# Patient Record
Sex: MALE | Race: BLACK OR AFRICAN AMERICAN | NOT HISPANIC OR LATINO | Employment: FULL TIME | URBAN - METROPOLITAN AREA
[De-identification: names, ages, dates, MRNs, and addresses within clinical notes are randomized per-mention and may not be internally consistent; named-entity substitution may affect disease eponyms.]

---

## 2017-01-19 ENCOUNTER — HOSPITAL ENCOUNTER (OUTPATIENT)
Dept: RADIOLOGY | Facility: HOSPITAL | Age: 52
Discharge: HOME | End: 2017-01-19
Attending: INTERNAL MEDICINE
Payer: MEDICAID

## 2017-01-19 DIAGNOSIS — M54.2 NECK PAIN: ICD-10-CM

## 2017-01-19 PROCEDURE — 72040 X-RAY EXAM NECK SPINE 2-3 VW: CPT | Mod: 26,,, | Performed by: RADIOLOGY

## 2017-01-19 PROCEDURE — 72040 X-RAY EXAM NECK SPINE 2-3 VW: CPT | Mod: TC

## 2017-03-05 ENCOUNTER — HOSPITAL ENCOUNTER (EMERGENCY)
Facility: HOSPITAL | Age: 52
Discharge: HOME | End: 2017-03-05
Attending: EMERGENCY MEDICINE
Payer: MEDICAID

## 2017-03-05 VITALS
HEART RATE: 74 BPM | DIASTOLIC BLOOD PRESSURE: 74 MMHG | BODY MASS INDEX: 29.76 KG/M2 | TEMPERATURE: 98 F | RESPIRATION RATE: 19 BRPM | OXYGEN SATURATION: 100 % | WEIGHT: 190 LBS | SYSTOLIC BLOOD PRESSURE: 118 MMHG

## 2017-03-05 DIAGNOSIS — M25.562 LEFT KNEE PAIN: ICD-10-CM

## 2017-03-05 PROCEDURE — 99283 EMERGENCY DEPT VISIT LOW MDM: CPT

## 2017-03-05 RX ORDER — HYDROCODONE BITARTRATE AND ACETAMINOPHEN 5; 325 MG/1; MG/1
1 TABLET ORAL EVERY 6 HOURS PRN
Qty: 12 TABLET | Refills: 0 | Status: SHIPPED | OUTPATIENT
Start: 2017-03-05 | End: 2017-03-17

## 2017-03-05 NOTE — ED NOTES
Presents to the ER with c/o left knee pain and swelling that started 5 days ago. Patient reports being diagnosed with arthritis a few years ago, was given some injections into his knee and has not had a problem for the past few years. Denies any fall or injury. OLGA ROSALES

## 2017-03-05 NOTE — ED NOTES
Upon discharge, patient is AAOx4, no cardiac or respiratory complications. Follow up care and  Medications have been reviewed with patient and has been instructed to return to the ER if needed. Patient verbalized understanding and ambulated to the lobby without difficulty. OLGA ROSALES.

## 2017-03-05 NOTE — ED AVS SNAPSHOT
[facility] [address]  [place] [address]               Jarred Robins   3/5/2017  3:10 PM   ED    Description:  Male : 1965   Department:  [facility]           Your Care was Coordinated By:     Provider Role From To    Rinku Rosen MD Attending Provider 17 1528 --      Reason for Visit     Knee Pain           Diagnoses this Visit        Comments    Left knee pain           ED Disposition     ED Disposition Condition Comment    Discharge             To Do List           Follow-up Information     Follow up with [facility] - Orthopedics.    Specialty:  Orthopedics    Why:  3-5 days (orthopedics)    Contact information:    [address]  [facility] [address] [phone]    Additional information:    [facility] [address]       These Medications        Disp Refills Start End    hydrocodone-acetaminophen 5-325mg (NORCO) 5-325 mg per tablet 12 tablet 0 3/5/2017 3/17/2017    Take 1 tablet by mouth every 6 (six) hours as needed for Pain. - Oral    [facility] [address] [place] [address] [address] AT [facility] Ph #: [phone]         [facility]     [facility] Care Line -  Assistance  Registered nurses in the [facility] provide clinical advisement, health education, appointment booking, and other advisory services.  Call for this free service at [phone].             Medications           Message regarding Medications     Verify the changes and/or additions to your medication regime listed below are the same as discussed with your clinician today.  If any of these changes or additions are incorrect, please notify your healthcare provider.        START taking these NEW medications        Refills    hydrocodone-acetaminophen 5-325mg (NORCO) 5-325 mg per tablet 0    Sig: Take 1 tablet by mouth every 6 (six) hours as needed for  Pain.    Class: Print    Route: Oral           Verify that the below list of medications is an accurate representation of the medications you are currently taking.  If none reported, the list may be blank. If incorrect, please contact your healthcare provider. Carry this list with you in case of emergency.           Current Medications     aspirin (ECOTRIN) 81 MG EC tablet Take 81 mg by mouth once daily.    hydrocodone-acetaminophen 5-325mg (NORCO) 5-325 mg per tablet Take 1 tablet by mouth every 6 (six) hours as needed for Pain.    metoprolol succinate (TOPROL-XL) 50 MG 24 hr tablet TAKE 1 TABLET BY MOUTH ONCE DAILY    metoprolol succinate (TOPROL-XL) 50 MG 24 hr tablet TAKE 1 TABLET BY MOUTH ONCE DAILY    NEXIUM 40 mg capsule TAKE 1 CAPSULE BY MOUTH EVERY MORNING BEFORE BREAKFAST           Clinical Reference Information           Your Vitals Were     BP Pulse Temp Resp Weight SpO2    118/74 74 97.8 °F (36.6 °C) (Oral) 19 86.2 kg (190 lb) 100%    BMI                29.76 kg/m2          Allergies as of 3/5/2017     No Known Allergies      Immunizations Administered on Date of Encounter - 3/5/2017     None      ED Micro, Lab, POCT     None      ED Imaging Orders     Start Ordered       Status Ordering Provider    03/05/17 1540 03/05/17 1539  X-Ray Knee 3 View Left  1 time imaging      Final result       Discharge References/Attachments     KNEE PAIN (ENGLISH)      [facility] Sign-Up     Activating your [facility] account is as easy as 1-2-3!     1) Visit [email or web address], select Sign Up Now, enter this activation code and your date of birth, then select Next.  [email or web address]  Expires: 4/19/2017  4:26 PM      2) Create a username and password to use when you visit [facility] in the future and select a security question in case you lose your password and select Next.    3) Enter your e-mail address and click Sign Up!    Additional Information  If you have questions, please e-mail [email or web address] or call  [phone] to talk to our [facility] staff. Remember, [employer] is NOT to be used for urgent needs. For medical emergencies, dial 911.         Smoking Cessation     If you would like to quit smoking:   You may be eligible for free services if you are a [place] resident and started smoking cigarettes before September 1, 1988.  Call the Smoking Cessation Trust (SCT) toll free at (020) 442-9566 or (399) 398-8631.   Call 1-800-QUIT-NOW if you do not meet the above criteria.             [facility] complies with applicable Federal civil rights laws and does not discriminate on the basis of race, color, national origin, age, disability, or sex.        Language Assistance Services     ATTENTION: Language assistance services are available, free of charge. Please call [phone].      ATENCIÓN: Si habla español, tiene a moeller disposición servicios gratuitos de asistencia lingüística. Llame al [phone].     CHÚ Ý: N?u b?n nói Ti?ng Vi?t, có các d?ch v? h? tr? ngôn ng? mi?n phí dành cho b?n. G?i s? [phone].
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
## 2022-06-10 ENCOUNTER — TELEPHONE (OUTPATIENT)
Dept: FAMILY MEDICINE | Facility: CLINIC | Age: 57
End: 2022-06-10
Payer: MEDICAID

## 2022-06-10 NOTE — TELEPHONE ENCOUNTER
----- Message from Radha Mac sent at 6/10/2022 10:49 AM CDT -----  Regarding: Call back  Contact: [phone]  Type:  Patient Call Back    Who Called:pt  What this is regarding?:schedule new patient appt with medicaid   Would the patient rather a call back or a response via [employer]? Call back  Best Call Back Number:[phone]  Additional Information:     Advised to call back directly if there are further questions, or if these symptoms fail to improve as anticipated or worsen.

## 2024-01-22 ENCOUNTER — OCCUPATIONAL HEALTH (OUTPATIENT)
Dept: URGENT CARE | Facility: CLINIC | Age: 59
End: 2024-01-22

## 2024-01-22 DIAGNOSIS — Z00.00 ENCOUNTER FOR PHYSICAL EXAMINATION: Primary | ICD-10-CM

## 2024-01-22 LAB — COLLECTION ONLY: NORMAL

## 2024-01-22 PROCEDURE — 99499 UNLISTED E&M SERVICE: CPT | Mod: S$GLB,,, | Performed by: PHYSICIAN ASSISTANT

## 2024-01-29 ENCOUNTER — OCCUPATIONAL HEALTH (OUTPATIENT)
Dept: URGENT CARE | Facility: CLINIC | Age: 59
End: 2024-01-29

## 2024-01-29 DIAGNOSIS — Z13.9 ENCOUNTER FOR SCREENING: Primary | ICD-10-CM

## 2024-01-29 LAB — COLLECTION ONLY: NORMAL

## 2024-01-29 PROCEDURE — 80305 DRUG TEST PRSMV DIR OPT OBS: CPT | Mod: S$GLB,,, | Performed by: PHYSICIAN ASSISTANT

## 2024-11-12 ENCOUNTER — HOSPITAL ENCOUNTER (EMERGENCY)
Facility: HOSPITAL | Age: 59
Discharge: HOME | End: 2024-11-12
Attending: EMERGENCY MEDICINE
Payer: COMMERCIAL

## 2024-11-12 VITALS
WEIGHT: 207 LBS | RESPIRATION RATE: 18 BRPM | OXYGEN SATURATION: 99 % | DIASTOLIC BLOOD PRESSURE: 75 MMHG | SYSTOLIC BLOOD PRESSURE: 139 MMHG | TEMPERATURE: 98 F | HEART RATE: 74 BPM | BODY MASS INDEX: 32.49 KG/M2 | HEIGHT: 67 IN

## 2024-11-12 DIAGNOSIS — R03.0 ELEVATED BLOOD PRESSURE READING: Primary | ICD-10-CM

## 2024-11-12 PROCEDURE — 99281 EMR DPT VST MAYX REQ PHY/QHP: CPT
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
